# Patient Record
Sex: MALE | Race: WHITE
[De-identification: names, ages, dates, MRNs, and addresses within clinical notes are randomized per-mention and may not be internally consistent; named-entity substitution may affect disease eponyms.]

---

## 2019-05-21 ENCOUNTER — APPOINTMENT (OUTPATIENT)
Dept: ORTHOPEDIC SURGERY | Facility: CLINIC | Age: 49
End: 2019-05-21

## 2020-04-25 ENCOUNTER — MESSAGE (OUTPATIENT)
Age: 50
End: 2020-04-25

## 2020-05-05 ENCOUNTER — APPOINTMENT (OUTPATIENT)
Dept: DISASTER EMERGENCY | Facility: HOSPITAL | Age: 50
End: 2020-05-05

## 2020-05-05 LAB
SARS-COV-2 IGG SERPL IA-ACNC: 0.1 INDEX
SARS-COV-2 IGG SERPL QL IA: NEGATIVE

## 2024-12-21 ENCOUNTER — EMERGENCY (EMERGENCY)
Facility: HOSPITAL | Age: 54
LOS: 1 days | Discharge: HOME | End: 2024-12-21
Attending: STUDENT IN AN ORGANIZED HEALTH CARE EDUCATION/TRAINING PROGRAM | Admitting: STUDENT IN AN ORGANIZED HEALTH CARE EDUCATION/TRAINING PROGRAM
Payer: COMMERCIAL

## 2024-12-21 VITALS
RESPIRATION RATE: 18 BRPM | WEIGHT: 184.97 LBS | HEIGHT: 76 IN | SYSTOLIC BLOOD PRESSURE: 124 MMHG | HEART RATE: 67 BPM | OXYGEN SATURATION: 99 % | DIASTOLIC BLOOD PRESSURE: 72 MMHG | TEMPERATURE: 98 F

## 2024-12-21 VITALS
SYSTOLIC BLOOD PRESSURE: 118 MMHG | HEART RATE: 71 BPM | RESPIRATION RATE: 18 BRPM | DIASTOLIC BLOOD PRESSURE: 68 MMHG | OXYGEN SATURATION: 98 % | TEMPERATURE: 98 F

## 2024-12-21 PROCEDURE — 99284 EMERGENCY DEPT VISIT MOD MDM: CPT | Mod: 25

## 2024-12-21 PROCEDURE — 73080 X-RAY EXAM OF ELBOW: CPT

## 2024-12-21 PROCEDURE — 73080 X-RAY EXAM OF ELBOW: CPT | Mod: 26,RT

## 2024-12-21 PROCEDURE — 73090 X-RAY EXAM OF FOREARM: CPT

## 2024-12-21 PROCEDURE — 99284 EMERGENCY DEPT VISIT MOD MDM: CPT

## 2024-12-21 PROCEDURE — 73090 X-RAY EXAM OF FOREARM: CPT | Mod: 26,RT

## 2024-12-21 RX ORDER — NAPROXEN SODIUM 275 MG
1 TABLET ORAL
Qty: 20 | Refills: 0
Start: 2024-12-21 | End: 2024-12-30

## 2024-12-21 RX ORDER — IBUPROFEN 200 MG
600 TABLET ORAL ONCE
Refills: 0 | Status: COMPLETED | OUTPATIENT
Start: 2024-12-21 | End: 2024-12-21

## 2024-12-21 RX ADMIN — Medication 600 MILLIGRAM(S): at 14:19

## 2024-12-21 RX ADMIN — Medication 600 MILLIGRAM(S): at 13:35

## 2024-12-21 NOTE — ED PROVIDER NOTE - SKIN, MLM
Referral to ortho and podiatry for feet  In the process of being evaluated by vascular doctor for leg/feet pain also  Would advise doing testing   no warmth or erythema

## 2024-12-21 NOTE — ED PROVIDER NOTE - PATIENT PORTAL LINK FT
You can access the FollowMyHealth Patient Portal offered by [facility] by registering at the following website: [email or web address]. By joining [employer]’s FollowMyHealth portal, you will also be able to view your health information using other applications (apps) compatible with our system.

## 2024-12-21 NOTE — ED PROVIDER NOTE - PROGRESS NOTE DETAILS
Patient stable.  X-rays unremarkable for acute fracture.  Discussed symptoms likely related to partial bicep muscle tear.  No palpable defect.  Strong radial pulse.  Capillary fill less than 2 seconds.  Sling provided by nurse.  Post application shows good alignment, neurovascular intact.  Recommend follow-up with orthopedics, may need outpatient MRI for further evaluation

## 2024-12-21 NOTE — ED PROVIDER NOTE - WR INTERPRETED BY 1
Spoke with patient. Appointment scheduled for 8/27/20 at 12PM. Verified date, time, place, and where to park. Patient verbalized understanding. Marcelina Page

## 2024-12-21 NOTE — ED PROVIDER NOTE - NEUROLOGICAL, MLM
Alert and oriented, no focal deficits, no motor or sensory deficits. cap refill < 2 sec. +radial pulses. no wrist drop

## 2024-12-21 NOTE — ED PROVIDER NOTE - OBJECTIVE STATEMENT
Otherwise healthy 54-year-old male presents with sudden onset of right arm pain after lifting a heavy stove.  Patient was lifting a heavy stool with right arm and felt a sudden pop near her right elbow.  Has had increased pain and swelling near her right elbow.  States his hand has some discoloration and felt cool to touch.  Still feels cool to touch but overall improved and discoloration has improved.  Patient has full range of motion but has increased pain with movement.  Denies any numbness or tingling.  Patient is right-handed.  Has not taken thing over-the-counter for pain or symptoms.  Pain 7 out of 10  PCP Federico oseguera

## 2024-12-21 NOTE — ED ADULT NURSE NOTE - OBJECTIVE STATEMENT
pt A&Ox4 to ED with c/o right arm injury. reports he was lifting something heavy when he heard a "pop" with sudden onset sharp pain. denies taking any meds today for pain prior to arrival. able to move arm, but with increased pain

## 2024-12-21 NOTE — ED PROVIDER NOTE - ATTENDING APP SHARED VISIT CONTRIBUTION OF CARE
Milena ATTG See MDM I performed a history and physical exam of the patient and discussed their management with the Physician assistant reviewed the PAs note and agree with the documented findings and plan of care. My medical decision making and observations are found above.

## 2024-12-21 NOTE — ED PROVIDER NOTE - CARE PROVIDER_API CALL
Rosas Cheung  Orthopaedic Surgery  [address] [place] [id number]  Phone: [phone]  Fax: [phone]  Follow Up Time: 1-3 Days

## 2024-12-21 NOTE — ED PROVIDER NOTE - CLINICAL SUMMARY MEDICAL DECISION MAKING FREE TEXT BOX
Milena RINCON    Otherwise healthy 54-year-old male presents with sudden onset of right arm pain after lifting a heavy stove.  Patient was lifting a heavy stool with right arm and felt a sudden pop near her right elbow.  Has had increased pain and swelling near her right elbow.  States his hand has some discoloration and felt cool to touch.  Still feels cool to touch but overall improved and discoloration has improved.  Patient has full range of motion but has increased pain with movement.  Denies any numbness or tingling.  Patient is right-handed.  Has not taken thing over-the-counter for pain or symptoms.  Pain 7 out of 10    on pe pt has 5/5 strength of the UE both sides, no obvious deformities most likely soft tissue injury

## 2024-12-21 NOTE — ED ADULT TRIAGE NOTE - CCCP TRG CHIEF CMPLNT
LMTCB 1st attempt    Procedure: Left L3-4 + L5-S1 facet joint injections  Anesthesia: Local  Dx: E27.484  Location: [address] [place] [address]  CPT Codes: O9561136, [id number] arm pain/injury

## 2024-12-21 NOTE — ED PROVIDER NOTE - NSFOLLOWUPINSTRUCTIONS_ED_ALL_ED_FT
Sling, rest, ice  Motrin or Aleve over-the-counter  Follow-up with orthopedics.  Referral provided if needed.  Call to make appointment        Distal Biceps Tendon Tear  An arm, showing the biceps tendon in the elbow.  The distal biceps tendon is a strong cord of tissue that connects the biceps muscle to a bone (radius) in the elbow. The biceps muscle is a muscle on the front of the upper arm. A distal biceps tendon tear may include:  A complete tear in the tendon, causing the tendon to completely separate from the radius. When this happens, the biceps muscle pulls up toward the shoulder.  A partial tear in the tendon that causes the tendon to partially separate from the radius.  This condition can affect your ability to turn your hand palm-up and bend your elbow. It is often treated with surgery. Recovery may take 4–8 months.    What are the causes?  This condition is usually caused by suddenly straightening the elbow while the biceps muscle is tightened (contracted). This could happen while lifting or carrying a heavy object that suddenly falls or shifts position.    What increases the risk?  The following factors may make you more likely to develop this condition:  Being a 30–50 year old male.  Smoking.  Using steroids.  What are the signs or symptoms?  Symptoms of this condition may include:  A feeling like a snap or a pop in the elbow at the time of injury.  Pain, swelling, and bruising in the front of the elbow.  Weakness in the arm.  A bulge in the upper arm. You may feel this in the front of the arm, the inside of the arm, or both.  Limited range of motion of the elbow and forearm.  How is this diagnosed?  This condition may be diagnosed based on:  Your symptoms and medical history.  A physical exam. Your health care provider may test your range of motion by having you do arm movements.  Imaging tests, such as X-rays, MRI, or ultrasound.  How is this treated?  Treatment for this condition may include:  Medicines to help relieve pain and inflammation.  A splint or brace to immobilize your elbow.  A sling to help rest your arm.  Resting from sports and intense physical activity.  Surgery to reattach your tendon to your radius. This is the most common treatment.  Physical therapy.  Follow these instructions at home:  If you have a removable splint, brace, or sling:    Wear the splint, brace, or sling as told by your health care provider. Remove it only as told by your health care provider.  Do not put pressure on any part of the splint until it is fully hardened. This may take several hours.  Check the skin around the splint, brace, or sling every day. Tell your health care provider about any concerns.  Loosen the splint, brace, or sling if your fingers tingle, become numb, or turn cold and blue.  Keep the splint, brace, or sling clean and dry.  Bathing    Do not take baths, swim, or use a hot tub until your health care provider approves. Ask your health care provider if you may take showers. You may only be allowed to take sponge baths.  If you have a splint, brace, or sling that is not waterproof:  Do not let it get wet.  Cover it with a watertight covering when you take a bath or shower.  Managing pain, stiffness, and swelling    Bag of ice on a towel on the skin.  A heating pad for use on the affected area.  If directed, put ice on the injured area. To do this:  If you have a removable splint, brace, or sling, remove it as told by your health care provider.  Put ice in a plastic bag.  Place a towel between your skin and the bag.  Leave the ice on for 20 minutes, 2–3 times a day.  If directed, apply heat to the affected area before you exercise or as often as told by your health care provider. Use the heat source that your health care provider recommends, such as a moist heat pack or a heating pad.  Place a towel between your skin and the heat source.  Leave the heat on for 20–30 minutes.  If your skin turns bright red, remove the ice or heat right away to prevent skin damage. The risk of skin damage is higher if you cannot feel pain, heat, or cold.  Move your fingers often to avoid stiffness and to lessen swelling.  Raise (elevate) the injured area above the level of your heart while you are sitting or lying down.  Activity    Until your health care provider approves:  Do not lift or carry anything with your injured arm.  Do not use the affected arm to support your body weight.  Do not participate in contact sports.  Ask your health care provider when it is safe to drive if you have a splint, brace, or sling on your arm.  Do exercises as told by your health care provider.  Avoid activities that cause pain or make your condition worse.  Return to your normal activities as told by your health care provider. Ask your health care provider what activities are safe for you.  General instructions    Take over-the-counter and prescription medicines only as told by your health care provider.  Do not use any products that contain nicotine or tobacco. These products include cigarettes, chewing tobacco, and vaping devices, such as e-cigarettes. These can delay healing. If you need help quitting, ask your health care provider.  Keep all follow-up visits. Your health care provider will check if your injury is healing.  Contact a health care provider if:  Your splint or brace causes pain or numbness.  Get help right away if:  You develop severe pain.  You develop numbness or tingling in your hand.  Your hand feels unusually cold.  Your fingernails turn a dark color, such as blue or gray.  This information is not intended to replace advice given to you by your health care provider. Make sure you discuss any questions you have with your health care provider.

## 2024-12-21 NOTE — ED PROVIDER NOTE - MUSCULOSKELETAL, MLM
soft tissue tenderness to right elbow and proximal right forearm. full ROM. increased pain with full flexion and full extension. no deformities

## 2025-09-20 ENCOUNTER — NON-APPOINTMENT (OUTPATIENT)
Age: 55
End: 2025-09-20